# Patient Record
(demographics unavailable — no encounter records)

---

## 2024-11-05 NOTE — HEALTH RISK ASSESSMENT
[Little interest or pleasure doing things] : 1) Little interest or pleasure doing things [Feeling down, depressed, or hopeless] : 2) Feeling down, depressed, or hopeless [0] : 2) Feeling down, depressed, or hopeless: Not at all (0) [PHQ-2 Negative - No further assessment needed] : PHQ-2 Negative - No further assessment needed [de-identified] : I spend 5 min performing a depression screening on this patient [[id number]] : 0 [Never] : Never

## 2024-11-05 NOTE — HISTORY OF PRESENT ILLNESS
[de-identified] : Patient is 35-year-old female with past medical history of thyroid nodules and abnormal Pap smear status post conization who comes to establish care. Thyroid Bx benign, pt was dx with ADD some years ago in [place] by a general doctor but never by a psychiatrist. Recently more distracted and forgetful.

## 2024-12-17 NOTE — HEALTH RISK ASSESSMENT
[Little interest or pleasure doing things] : 1) Little interest or pleasure doing things [Feeling down, depressed, or hopeless] : 2) Feeling down, depressed, or hopeless [0] : 2) Feeling down, depressed, or hopeless: Not at all (0) [PHQ-2 Negative - No further assessment needed] : PHQ-2 Negative - No further assessment needed [de-identified] : I spend 5 min performing a depression screening on this patient [[id number]] : 0 [Never] : Never

## 2024-12-17 NOTE — HISTORY OF PRESENT ILLNESS
[Home] : at home, [unfilled] , at the time of the visit. [Medical Office: ([facility])___] : at the medical office located in  [Verbal consent obtained from patient] : the patient, [unfilled] [de-identified] : Patient is 36-year-old female with past medical history of thyroid nodules and abnormal Pap smear status post conization, VV for f/u. Since the last visit the pt saw Psych, started low dose of Ritalin XR

## 2025-03-27 NOTE — PLAN
[TextEntry] : 12-month follow-up right breast sonogram due in March 2026 Patient to perform self-breast exams as instructed in the office. Patient to follow-up in 1 year after imaging completed.

## 2025-03-27 NOTE — HISTORY OF PRESENT ILLNESS
[FreeTextEntry1] : CAN is a 36 year old female, who presents for follow-up regarding to palpable right breast abnormalities.  The patient states that around the age of 16 she was able to palpate a right breast lump, she states starting at the age of 18 she has been followed by a breast surgeon and has undergone annual breast ultrasounds to follow the palpable right breast lump.  The patient states about 3 years ago she noted a second lump adjacent to the original palpable abnormality, she states she was sent for a breast MRI and subsequently underwent a breast biopsy (reports are in Spanish, not easily translated). The patient stated that the biopsy pathology referred to " angiomatous proliferation".  The patient states that she was told the mass could be removed however states that when she was being followed in [place] they were hesitant to do so.  The patient underwent bilateral diagnostic mammogram and sonogram on 2/28/2024.  Imaging showed a nodule in the right breast that corresponds to the palpable nodule.  Ultrasound-guided core biopsy was performed on 3/1/2024 and returned as consistent with lymphangioma, benign and concordant. She underwent bilateral follow-up US today, which recommends continued monitoring with right US in 1 year.  Patient is currently doing well with no breast complaints nor any nipple discharge.  SIGIFREDO lifetime risk of 14.4%; Denies family history of breast or ovarian cancer

## 2025-03-27 NOTE — FAMILY HISTORY
[TextEntry] : Paternal GM Colon CA, age 58 Paternsl GF Prostate CA, age 93 No family hx of Breast, ovarian, and prostate CA

## 2025-03-27 NOTE — PAST MEDICAL HISTORY
[Menarche Age ____] : age at menarche was [unfilled] [Definite ___ (Date)] : the last menstrual period was [unfilled] [Regular Cycle Intervals] : have been regular [Total Preg ___] : G[unfilled] [History of Hormone Replacement Treatment] : has no history of hormone replacement treatment [FreeTextEntry6] : n/a [FreeTextEntry7] : no [FreeTextEntry8] : n/a

## 2025-03-27 NOTE — DATA REVIEWED
[FreeTextEntry1] : 2/28/2024 ([facility]) bilateral DX mammogram/US: Heterogeneously dense, valuation of the right breast demonstrates a benign-appearing nodule at 7:00 5 cm from the nipple measuring 14 x 4 mm, stable since 10/2022. There is an additional mixed echogenicity nodule at 10:00 9 cm and the nipple, corresponding to palpable finding measuring 13 x 6 mm, slightly intervally enlarged since 3/2022 when it measured maximally 10 mm and ultrasound guided sampling is recommended. IMPRESSION: Recommend ultrasound-guided biopsy of a palpable nodule in the right breast at 10:00 9 cm from the nipple BIRADS 4B  3/1/2024 ([facility]) right breast 10:00 9 cm FN US core BX: Consistent with lymphangioma, benign and concordant. Recommend annual mammography on schedule  3/27/25 ([facility]) b/l US: Stable right 10:00 1.5 cm mixed echogenicity mass, previously biopsied yielding lymphangioma. Follow-up right targeted ultrasound in one year is recommended to assess for continued stability. BI-RADS 3.